# Patient Record
Sex: MALE | Employment: FULL TIME | URBAN - METROPOLITAN AREA
[De-identification: names, ages, dates, MRNs, and addresses within clinical notes are randomized per-mention and may not be internally consistent; named-entity substitution may affect disease eponyms.]

---

## 2024-07-09 ENCOUNTER — APPOINTMENT (OUTPATIENT)
Dept: LAB | Facility: CLINIC | Age: 34
End: 2024-07-09
Payer: COMMERCIAL

## 2024-07-09 ENCOUNTER — HOSPITAL ENCOUNTER (EMERGENCY)
Facility: CLINIC | Age: 34
Discharge: HOME | End: 2024-07-09
Attending: EMERGENCY MEDICINE | Admitting: EMERGENCY MEDICINE
Payer: COMMERCIAL

## 2024-07-09 VITALS
OXYGEN SATURATION: 99 % | RESPIRATION RATE: 18 BRPM | HEART RATE: 84 BPM | TEMPERATURE: 98.2 F | DIASTOLIC BLOOD PRESSURE: 69 MMHG | SYSTOLIC BLOOD PRESSURE: 116 MMHG

## 2024-07-09 DIAGNOSIS — R19.7 DIARRHEA OF PRESUMED INFECTIOUS ORIGIN: ICD-10-CM

## 2024-07-09 DIAGNOSIS — D72.829 LEUKOCYTOSIS, UNSPECIFIED TYPE: ICD-10-CM

## 2024-07-09 LAB
ALBUMIN SERPL BCG-MCNC: 4.2 G/DL (ref 3.5–5.2)
ALBUMIN UR-MCNC: NEGATIVE MG/DL
ALP SERPL-CCNC: 82 U/L (ref 40–150)
ALT SERPL W P-5'-P-CCNC: 24 U/L (ref 0–70)
ANION GAP SERPL CALCULATED.3IONS-SCNC: 9 MMOL/L (ref 7–15)
APPEARANCE UR: CLEAR
AST SERPL W P-5'-P-CCNC: 22 U/L (ref 0–45)
BASOPHILS # BLD AUTO: 0 10E3/UL (ref 0–0.2)
BASOPHILS NFR BLD AUTO: 0 %
BILIRUB SERPL-MCNC: 0.5 MG/DL
BILIRUB UR QL STRIP: NEGATIVE
BUN SERPL-MCNC: 10.1 MG/DL (ref 6–20)
C DIFF TOX B STL QL: NEGATIVE
CALCIUM SERPL-MCNC: 9.4 MG/DL (ref 8.6–10)
CHLORIDE SERPL-SCNC: 103 MMOL/L (ref 98–107)
COLOR UR AUTO: ABNORMAL
CREAT SERPL-MCNC: 0.87 MG/DL (ref 0.67–1.17)
DEPRECATED HCO3 PLAS-SCNC: 25 MMOL/L (ref 22–29)
EGFRCR SERPLBLD CKD-EPI 2021: >90 ML/MIN/1.73M2
EOSINOPHIL # BLD AUTO: 0 10E3/UL (ref 0–0.7)
EOSINOPHIL NFR BLD AUTO: 0 %
ERYTHROCYTE [DISTWIDTH] IN BLOOD BY AUTOMATED COUNT: 11.6 % (ref 10–15)
GLUCOSE SERPL-MCNC: 107 MG/DL (ref 70–99)
GLUCOSE UR STRIP-MCNC: NEGATIVE MG/DL
HCT VFR BLD AUTO: 39.4 % (ref 40–53)
HGB BLD-MCNC: 13.9 G/DL (ref 13.3–17.7)
HGB UR QL STRIP: NEGATIVE
IMM GRANULOCYTES # BLD: 0.2 10E3/UL
IMM GRANULOCYTES NFR BLD: 1 %
KETONES UR STRIP-MCNC: ABNORMAL MG/DL
LACTATE SERPL-SCNC: 0.8 MMOL/L (ref 0.7–2)
LEUKOCYTE ESTERASE UR QL STRIP: NEGATIVE
LYMPHOCYTES # BLD AUTO: 1.2 10E3/UL (ref 0.8–5.3)
LYMPHOCYTES NFR BLD AUTO: 5 %
MCH RBC QN AUTO: 29.7 PG (ref 26.5–33)
MCHC RBC AUTO-ENTMCNC: 35.3 G/DL (ref 31.5–36.5)
MCV RBC AUTO: 84 FL (ref 78–100)
MONOCYTES # BLD AUTO: 1 10E3/UL (ref 0–1.3)
MONOCYTES NFR BLD AUTO: 4 %
NEUTROPHILS # BLD AUTO: 21.8 10E3/UL (ref 1.6–8.3)
NEUTROPHILS NFR BLD AUTO: 90 %
NITRATE UR QL: NEGATIVE
NRBC # BLD AUTO: 0 10E3/UL
NRBC BLD AUTO-RTO: 0 /100
PH UR STRIP: 6 [PH] (ref 5–7)
PLATELET # BLD AUTO: 250 10E3/UL (ref 150–450)
POTASSIUM SERPL-SCNC: 3.8 MMOL/L (ref 3.4–5.3)
PROT SERPL-MCNC: 7.2 G/DL (ref 6.4–8.3)
RBC # BLD AUTO: 4.68 10E6/UL (ref 4.4–5.9)
RBC URINE: 0 /HPF
SODIUM SERPL-SCNC: 137 MMOL/L (ref 135–145)
SP GR UR STRIP: 1 (ref 1–1.03)
UROBILINOGEN UR STRIP-MCNC: NORMAL MG/DL
WBC # BLD AUTO: 24.2 10E3/UL (ref 4–11)
WBC URINE: <1 /HPF

## 2024-07-09 PROCEDURE — 258N000003 HC RX IP 258 OP 636: Performed by: EMERGENCY MEDICINE

## 2024-07-09 PROCEDURE — 87040 BLOOD CULTURE FOR BACTERIA: CPT | Performed by: EMERGENCY MEDICINE

## 2024-07-09 PROCEDURE — 82040 ASSAY OF SERUM ALBUMIN: CPT | Performed by: EMERGENCY MEDICINE

## 2024-07-09 PROCEDURE — 84450 TRANSFERASE (AST) (SGOT): CPT | Performed by: EMERGENCY MEDICINE

## 2024-07-09 PROCEDURE — 81001 URINALYSIS AUTO W/SCOPE: CPT | Performed by: EMERGENCY MEDICINE

## 2024-07-09 PROCEDURE — 96361 HYDRATE IV INFUSION ADD-ON: CPT

## 2024-07-09 PROCEDURE — 99283 EMERGENCY DEPT VISIT LOW MDM: CPT | Mod: 25

## 2024-07-09 PROCEDURE — 96360 HYDRATION IV INFUSION INIT: CPT

## 2024-07-09 PROCEDURE — 83605 ASSAY OF LACTIC ACID: CPT | Performed by: EMERGENCY MEDICINE

## 2024-07-09 PROCEDURE — 85025 COMPLETE CBC W/AUTO DIFF WBC: CPT | Performed by: EMERGENCY MEDICINE

## 2024-07-09 PROCEDURE — 36415 COLL VENOUS BLD VENIPUNCTURE: CPT | Performed by: EMERGENCY MEDICINE

## 2024-07-09 PROCEDURE — 87493 C DIFF AMPLIFIED PROBE: CPT | Performed by: EMERGENCY MEDICINE

## 2024-07-09 RX ORDER — KETOROLAC TROMETHAMINE 15 MG/ML
15 INJECTION, SOLUTION INTRAMUSCULAR; INTRAVENOUS ONCE
Status: COMPLETED | OUTPATIENT
Start: 2024-07-09 | End: 2024-07-09

## 2024-07-09 RX ORDER — ONDANSETRON 2 MG/ML
4 INJECTION INTRAMUSCULAR; INTRAVENOUS ONCE
Status: COMPLETED | OUTPATIENT
Start: 2024-07-09 | End: 2024-07-09

## 2024-07-09 RX ADMIN — SODIUM CHLORIDE 1000 ML: 9 INJECTION, SOLUTION INTRAVENOUS at 13:54

## 2024-07-09 ASSESSMENT — COLUMBIA-SUICIDE SEVERITY RATING SCALE - C-SSRS
1. IN THE PAST MONTH, HAVE YOU WISHED YOU WERE DEAD OR WISHED YOU COULD GO TO SLEEP AND NOT WAKE UP?: NO
6. HAVE YOU EVER DONE ANYTHING, STARTED TO DO ANYTHING, OR PREPARED TO DO ANYTHING TO END YOUR LIFE?: NO
2. HAVE YOU ACTUALLY HAD ANY THOUGHTS OF KILLING YOURSELF IN THE PAST MONTH?: NO

## 2024-07-09 ASSESSMENT — ACTIVITIES OF DAILY LIVING (ADL)
ADLS_ACUITY_SCORE: 35

## 2024-07-09 NOTE — ED TRIAGE NOTES
Pt referred to ED from ; pt reports feeling unwell for 2 weeks; labs were done and pt's WBC were elevated.

## 2024-07-09 NOTE — DISCHARGE INSTRUCTIONS
Lots of fluids.  Tylenol or ibuprofen as needed for sore muscles or fever.  You need to follow-up to primary care provider to ensure you are improving as expected but if you have worsening or new concerns you should return to the emergency department.  You will be called if any pending studies are positive including C. difficile.  At that time you will be started on antibiotics if needed.

## 2024-07-09 NOTE — ED PROVIDER NOTES
Emergency Department Note      History of Present Illness     Chief Complaint   Abnormal Labs      HPI   Marco A Jaeger is a healthy 33 year old male presenting alone for evaluation of abnormal labs.  About 2 weeks ago he developed myalgias, chills, and fever.  This progressed to include sore throat congestion and cough.  He was seen at an urgent care where reportedly he was treated with cefdinir despite a negative strep test.  He had only the congestion and cough which actually seemed to improve, and he had several negative COVID-19 tests, until the last 1 to 2 days when he had recurrence of myalgias and fever up to 101.5  F.  He has had watery yellow diarrhea at least 10 times a day for the last 2 days but no abdominal pain.  He did vomit this morning which he attributes to poor sleep last night.  He has mild headache. He took 500 mg of acetaminophen and 250 mg of ibuprofen about 90 minutes prior to arrival which he found to be helpful.  He has no known sick contacts.  He returned to urgent care for his symptoms today where laboratory studies were obtained revealing leukocytosis to 26 and referral to the emergency department.    Independent Historian   As above     Review of External Notes   I reviewed 5/2/24 Physical with Dr. Huerta.     I reviewed Urgent Care visit and labs from today.   Past Medical History     Medical History and Problem List   Anxiety     Medications   The patient is not currently taking any prescribed medications.   Physical Exam     Patient Vitals for the past 24 hrs:   BP Temp Temp src Pulse Resp SpO2   07/09/24 1830 116/69 -- -- 84 -- 99 %   07/09/24 1800 119/71 -- -- 83 -- 100 %   07/09/24 1732 -- 98.2  F (36.8  C) Oral -- -- --   07/09/24 1730 124/76 -- -- -- -- 100 %   07/09/24 1700 125/84 -- -- 97 18 100 %     Physical Exam  General: Well-developed and well-nourished. Well appearing young  man. Cooperative.  Head:  Atraumatic.  Eyes:  Conjunctivae, lids, and sclerae are  normal.  ENT:    Normal nose. Moist mucous membranes.  Visualized portion of posterior oropharynx is normal.  Neck:  Supple. Normal range of motion.  No nuchal rigidity.  CV:  Regular rate and rhythm. Normal heart sounds with no murmurs, rubs, or gallops detected.  Resp:  No respiratory distress. Clear to auscultation bilaterally without decreased breath sounds, wheezing, rales, or rhonchi.  GI:  Soft. Non-distended. Non-tender.    MS:  Normal ROM. No bilateral lower extremity edema.  Skin:  Warm. Non-diaphoretic. No pallor.  Neuro:  Awake. A&Ox3. Normal strength.  Psych: Normal mood and affect. Normal speech.  Vitals reviewed.     Diagnostics     Lab Results   Labs Ordered and Resulted from Time of ED Arrival to Time of ED Departure   COMPREHENSIVE METABOLIC PANEL - Abnormal       Result Value    Sodium 137      Potassium 3.8      Carbon Dioxide (CO2) 25      Anion Gap 9      Urea Nitrogen 10.1      Creatinine 0.87      GFR Estimate >90      Calcium 9.4      Chloride 103      Glucose 107 (*)     Alkaline Phosphatase 82      AST 22      ALT 24      Protein Total 7.2      Albumin 4.2      Bilirubin Total 0.5     CBC WITH PLATELETS AND DIFFERENTIAL - Abnormal    WBC Count 24.2 (*)     RBC Count 4.68      Hemoglobin 13.9      Hematocrit 39.4 (*)     MCV 84      MCH 29.7      MCHC 35.3      RDW 11.6      Platelet Count 250      % Neutrophils 90      % Lymphocytes 5      % Monocytes 4      % Eosinophils 0      % Basophils 0      % Immature Granulocytes 1      NRBCs per 100 WBC 0      Absolute Neutrophils 21.8 (*)     Absolute Lymphocytes 1.2      Absolute Monocytes 1.0      Absolute Eosinophils 0.0      Absolute Basophils 0.0      Absolute Immature Granulocytes 0.2      Absolute NRBCs 0.0     ROUTINE UA WITH MICROSCOPIC REFLEX TO CULTURE - Abnormal    Color Urine Straw      Appearance Urine Clear      Glucose Urine Negative      Bilirubin Urine Negative      Ketones Urine Trace (*)     Specific Gravity Urine 1.005       Blood Urine Negative      pH Urine 6.0      Protein Albumin Urine Negative      Urobilinogen Urine Normal      Nitrite Urine Negative      Leukocyte Esterase Urine Negative      RBC Urine 0      WBC Urine <1     LACTIC ACID WHOLE BLOOD - Normal    Lactic Acid 0.8     BLOOD CULTURE   BLOOD CULTURE   C. Difficile PCR pending    ED Course    Medications Administered   Medications   sodium chloride 0.9% BOLUS 1,000 mL (0 mLs Intravenous Stopped 7/9/24 1703)   ketorolac (TORADOL) injection 15 mg (15 mg Intravenous Not Given 7/9/24 1709)   ondansetron (ZOFRAN) injection 4 mg (4 mg Intravenous Not Given 7/9/24 1708)     ED Course   ED Course as of 07/09/24 1955 Tue Jul 09, 2024   1352 I obtained history and examined the patient as noted above.    1845 I rechecked the patient and explained findings. He is feeling well and comfortable with discharge. He declined Toradol and Zofran.       Optional/Additional Documentation  Employed as a pharmacist  Established PCP    Medical Decision Making / Diagnosis   CISCO Strickland is a 33 year old man who had myalgias and fever 2 weeks ago with sore throat, congestion, and cough.  He was treated with cefdinir despite a negative strep test at urgent care.  He overall seemed improved until he had recurrent myalgias and fevers to 101.5  F today.  He also has new diarrhea.  He went to urgent care and was referred to the emergency department because of leukocytosis to 26.  On arrival he is well-appearing and afebrile.  He has no abdominal tenderness to warrant abdominal imaging.    Laboratory studies are overall reassuring outside of leukocytosis to 24.2.  There is no UTI and he has done several negative COVID-19 swabs at home.  He had an x-ray at urgent care which was reportedly normal so we will not repeat that.  I sent blood cultures but see no evidence of a bacterial process today.  I am actually more concerned that he may have C. difficile after his use of cefdinir with now fever and  diarrhea.  The stool sample was sent.    Fortunately, he felt improved with IV fluids, not requiring Toradol or Zofran that I ordered.  At this point he is appropriate for discharge.  I recommended he drink lots of fluids and use Tylenol or ibuprofen for any myalgias or fever.  I recommended he follow-up with primary care to ensure he is improving as expected.  He understands he will be called if his C. difficile PCR is positive.  He also understands he should return if he has worsening symptoms or new concerns of any kind.  All questions answered.    Disposition   The patient was discharged.     Diagnosis     ICD-10-CM    1. Diarrhea of presumed infectious origin  R19.7       2. Leukocytosis, unspecified type  D72.829            Discharge Medications   There are no discharge medications for this patient.        Scribe Disclosure:  I, Sandra Trevino, am serving as a scribe at 1:55 PM on 7/9/2024 to document services personally performed by Caery Rhodes MD based on my observations and the provider's statements to me.        Carey Rhodes MD  07/18/24 [id number]

## 2024-07-09 NOTE — ED NOTES
Urgent care took cxr which was negative. Notes some congestion in chest. Also has had a mild to moderate headache. Has continued to have fevers. Did just take tylenol and ibuprofen 1 hour ago. Did go in 2 weeks ago and got tested for strep which was negative but he was still prescribed and antibiotic. He then developed diarrhea which he has had for the last 10 days

## 2024-07-14 LAB
BACTERIA BLD CULT: NO GROWTH
BACTERIA BLD CULT: NO GROWTH

## 2025-06-22 ENCOUNTER — HEALTH MAINTENANCE LETTER (OUTPATIENT)
Age: 35
End: 2025-06-22